# Patient Record
Sex: FEMALE | Employment: OTHER | URBAN - METROPOLITAN AREA
[De-identification: names, ages, dates, MRNs, and addresses within clinical notes are randomized per-mention and may not be internally consistent; named-entity substitution may affect disease eponyms.]

---

## 2020-09-24 ENCOUNTER — APPOINTMENT (OUTPATIENT)
Dept: GENERAL RADIOLOGY | Facility: CLINIC | Age: 20
End: 2020-09-24
Attending: EMERGENCY MEDICINE

## 2020-09-24 ENCOUNTER — HOSPITAL ENCOUNTER (EMERGENCY)
Facility: CLINIC | Age: 20
Discharge: HOME | End: 2020-09-24
Attending: EMERGENCY MEDICINE | Admitting: EMERGENCY MEDICINE

## 2020-09-24 VITALS
HEIGHT: 55 IN | BODY MASS INDEX: 28.23 KG/M2 | WEIGHT: 122 LBS | SYSTOLIC BLOOD PRESSURE: 125 MMHG | OXYGEN SATURATION: 99 % | RESPIRATION RATE: 18 BRPM | DIASTOLIC BLOOD PRESSURE: 80 MMHG | TEMPERATURE: 98 F | HEART RATE: 120 BPM

## 2020-09-24 DIAGNOSIS — S42.022A CLOSED DISPLACED FRACTURE OF SHAFT OF LEFT CLAVICLE, INITIAL ENCOUNTER: ICD-10-CM

## 2020-09-24 PROCEDURE — 96374 THER/PROPH/DIAG INJ IV PUSH: CPT

## 2020-09-24 PROCEDURE — 23500 CLTX CLAVICULAR FX W/O MNPJ: CPT | Mod: LT

## 2020-09-24 PROCEDURE — 25000128 H RX IP 250 OP 636

## 2020-09-24 PROCEDURE — 99285 EMERGENCY DEPT VISIT HI MDM: CPT | Mod: 25

## 2020-09-24 PROCEDURE — 73000 X-RAY EXAM OF COLLAR BONE: CPT | Mod: LT

## 2020-09-24 RX ORDER — MORPHINE SULFATE 15 MG/1
15-30 TABLET ORAL EVERY 4 HOURS PRN
Qty: 15 TABLET | Refills: 0 | Status: SHIPPED | OUTPATIENT
Start: 2020-09-24

## 2020-09-24 RX ORDER — FENTANYL CITRATE 50 UG/ML
50 INJECTION, SOLUTION INTRAMUSCULAR; INTRAVENOUS ONCE
Status: COMPLETED | OUTPATIENT
Start: 2020-09-24 | End: 2020-09-24

## 2020-09-24 RX ORDER — FENTANYL CITRATE 50 UG/ML
INJECTION, SOLUTION INTRAMUSCULAR; INTRAVENOUS
Status: COMPLETED
Start: 2020-09-24 | End: 2020-09-24

## 2020-09-24 RX ADMIN — FENTANYL CITRATE 50 MCG: 50 INJECTION, SOLUTION INTRAMUSCULAR; INTRAVENOUS at 01:15

## 2020-09-24 ASSESSMENT — MIFFLIN-ST. JEOR: SCORE: 1165.52

## 2020-09-24 NOTE — ED AVS SNAPSHOT
Emergency Department  [address] [facility] [address]  Phone:  [phone]  Fax:  [phone]                                    Татьяна Howard   MRN: [id number]    Department:   Emergency Department   Date of Visit:  9/24/2020           After Visit Summary Signature Page    I have received my discharge instructions, and my questions have been answered. I have discussed any challenges I see with this plan with the nurse or doctor.    ..........................................................................................................................................  Patient/Patient Representative Signature      ..........................................................................................................................................  Patient Representative Print Name and Relationship to Patient    ..................................................               ................................................  Date                                   Time    ..........................................................................................................................................  Reviewed by Signature/Title    ...................................................              ..............................................  Date                                               Time          22EPIC Rev 08/18

## 2020-09-24 NOTE — ED PROVIDER NOTES
"History     Chief Complaint:  Shoulder Pain     HPI  Татьяна Howrad is a 20 year old female who presents for evaluation of shoulder pain. The patient states that she fell with her shoulder tucked into her body and now has tenderness, bruising, and swelling in her clavicle.     Allergies:  No Known Drug Allergies     Medications:   Medications reviewed. No pertinent medications.     Medical History:   Past medical history reviewed. No pertinent medical history.     Surgical History   Surgical history reviewed. No pertinent surgical history.     Family History:   Family history reviewed. No pertinent family history.      Social History:  Patient was accompanied to the ED by her boyfriend.  Smoking Status: Negative   Smokeless Tobacco: Negative   Alcohol Use: Negative   Drug Use: Negative   Primary Physician: No primary care provider on file.     Review of Systems   Musculoskeletal:        Shoulder Pain   All other systems reviewed and are negative.      Physical Exam     Patient Vitals for the past 24 hrs:   BP Temp Temp src Pulse Resp SpO2 Height Weight   09/24/20 0047 125/80 98  F (36.7  C) Temporal 120 18 100 % 1.397 m (4' 7\") 55.3 kg (122 lb)          Physical Exam  MSK:  Normal movement through the elbow, wrist, and fingers without tendonous deficit. Point tenderness with bruising to the shaft of the left clavicle. No tenderness over the scapula or proximal humerus.     SKIN:  Warm and dry with strong radial pulse and normal capillary refill.    NEURO:  5/5 strength through the fingers/wrist/elbow.  Normal sensation through the radial/ulnar/median nerve distributions.    PSYCH:  Normal affect     Emergency Department Course     Imaging:  Radiology results were communicated with the patient who voiced understanding of the findings.    XR Clavicle Left  Comminuted, displaced fracture of the mid to distal left clavicle. Overriding fragments. Cranial displacement of the proximal fragment relative to distal. "     Reading per radiology     Interventions:   0115 Sublimaze 50 mcg IV    Emergency Department Course:    0100 Nursing notes and vitals reviewed.    0115 I performed an exam of the patient as documented above.     0303 The patient was sent for XR while in the emergency department, results above.     0305 Findings and plan explained to the Patient. Patient discharged home with instructions regarding supportive care, medications, and reasons to return. The importance of close follow-up was reviewed. The patient was prescribed as below.    Impression & Plan     Medical Decision Making:  This healthy young woman presents after a mechanical fall where she landed onto her left shoulder.  She has discomfort and swelling directly over the clavicle.  Fracture is confirmed with x-ray.  There is no evidence of tenting of the skin or posterior depression with concerns for lung injury.  She will be placed into a sling and discharged home with pain medication and close outpatient orthopedic follow-up.  This is a comminuted fracture with some displacement and it may require surgical fixation, and therefore I will have her follow-up with orthopedics.  There is no evidence of neurovascular dysfunction.  She will otherwise return to us for any worsening of condition or other emergent concerns.      Diagnosis:     ICD-10-CM    1. Closed displaced fracture of shaft of left clavicle, initial encounter  S42.022A         Disposition:  Discharged to home.    Discharge Medications:  Discharge Medication List as of 9/24/2020  3:16 AM      START taking these medications    Details   morphine (MSIR) 15 MG IR tablet Take 1-2 tablets (15-30 mg) by mouth every 4 hours as needed for moderate to severe pain, Disp-15 tablet,R-0, Local Print             Dragon Disclosure   This was created at least in part with a voice recognition software. Mistakes/typos may be present.      Scribe Disclosure:  Geoffrey HOUSTON, am serving as a scribe at 1:18 AM  on 9/24/2020 to document services personally performed by Chad A. Trierweiler MD based on my observations and the provider's statements to me.     [place] Trierweiler, Chad A, MD  09/24/20 1909

## 2020-11-04 ENCOUNTER — APPOINTMENT (OUTPATIENT)
Dept: INTERPRETER SERVICES | Facility: CLINIC | Age: 20
End: 2020-11-04

## 2020-11-23 ENCOUNTER — APPOINTMENT (OUTPATIENT)
Dept: INTERPRETER SERVICES | Facility: CLINIC | Age: 20
End: 2020-11-23

## 2021-01-04 ENCOUNTER — HEALTH MAINTENANCE LETTER (OUTPATIENT)
Age: 21
End: 2021-01-04

## 2021-02-18 ENCOUNTER — APPOINTMENT (OUTPATIENT)
Dept: INTERPRETER SERVICES | Facility: CLINIC | Age: 21
End: 2021-02-18

## 2021-02-22 ENCOUNTER — APPOINTMENT (OUTPATIENT)
Dept: INTERPRETER SERVICES | Facility: CLINIC | Age: 21
End: 2021-02-22

## 2021-10-11 ENCOUNTER — HEALTH MAINTENANCE LETTER (OUTPATIENT)
Age: 21
End: 2021-10-11

## 2022-01-30 ENCOUNTER — HEALTH MAINTENANCE LETTER (OUTPATIENT)
Age: 22
End: 2022-01-30

## 2022-09-24 ENCOUNTER — HEALTH MAINTENANCE LETTER (OUTPATIENT)
Age: 22
End: 2022-09-24

## 2023-05-08 ENCOUNTER — HEALTH MAINTENANCE LETTER (OUTPATIENT)
Age: 23
End: 2023-05-08